# Patient Record
Sex: MALE | Race: OTHER | NOT HISPANIC OR LATINO
[De-identification: names, ages, dates, MRNs, and addresses within clinical notes are randomized per-mention and may not be internally consistent; named-entity substitution may affect disease eponyms.]

---
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
## 2020-07-22 NOTE — ASU PATIENT PROFILE, PEDIATRIC - TEACHING/LEARNING LEARNING PREFERENCES PEDS
Visit Information    Have you changed or started any medications since your last visit including any over-the-counter medicines, vitamins, or herbal medicines? no   Are you having any side effects from any of your medications? -  no  Have you stopped taking any of your medications? Is so, why? -  no    Have you seen any other physician or provider since your last visit? Yes - Records Obtained  Have you had any other diagnostic tests since your last visit? Yes - Records Obtained  Have you been seen in the emergency room and/or had an admission to a hospital since we last saw you? Yes - Records Obtained  Have you had your routine dental cleaning in the past 6 months? no    Have you activated your [employer] account? If not, what are your barriers?  Yes     Patient Care Team:  Amado Staton MD as PCP - General (Family Medicine)  Amado Staton MD as PCP - [facility] Provider  Nicolasa Flores MD as Referring Physician (Orthopedic Surgery)  Stefanie Person MD as Surgeon (Orthopedic Surgery)  Mylo Cabot, MD as Consulting Physician (Endocrinology)  Roberto Newell DO as Consulting Physician (Obstetrics & Gynecology)  Eliane Keith MD as Consulting Physician (Urology)  MAX Arndt CNP as Nurse Practitioner (Certified Nurse Practitioner)  Lucina Gilbert MD as Consulting Physician (Otolaryngology)  Beltran Basilio MD as Consulting Physician (Obstetrics & Gynecology)  Javon Guzman DO as Consulting Physician (General Surgery)  Ismael Sotomayor RN as Ambulatory Care Manager    Medical History Review  Past Medical, Family, and Social History reviewed and does contribute to the patient presenting condition    Health Maintenance   Topic Date Due    Diabetic retinal exam  01/08/1962    Shingles Vaccine (1 of 2) 01/08/2002    Diabetic foot exam  02/27/2018    Annual Wellness Visit (AWV)  05/29/2019    Breast cancer screen  05/06/2020    Colon cancer screen colonoscopy  06/26/2020    Flu vaccine (1) 09/01/2020    A1C test (Diabetic or Prediabetic)  01/09/2021    Lipid screen  01/28/2021    Diabetic microalbuminuria test  01/30/2021    TSH testing  06/17/2021    Potassium monitoring  07/21/2021    Creatinine monitoring  07/21/2021    DTaP/Tdap/Td vaccine (2 - Td) 09/28/2027    DEXA (modify frequency per FRAX score)  Completed    Pneumococcal 65+ years Vaccine  Completed    Hepatitis C screen  Completed    Hepatitis A vaccine  Aged Out    Hib vaccine  Aged Out    Meningococcal (ACWY) vaccine  Aged Out audio

## 2020-12-03 NOTE — ASU PATIENT PROFILE, PEDIATRIC - FALL HARM RISK
Procedure(s):  COLONOSCOPY  ESOPHAGOGASTRODUODENAL (EGD) BIOPSY.     total IV anesthesia, general    Anesthesia Post Evaluation      Multimodal analgesia: multimodal analgesia not used between 6 hours prior to anesthesia start to PACU discharge  Patient location during evaluation: bedside  Patient participation: complete - patient participated  Level of consciousness: sleepy but conscious  Airway patency: patent  Anesthetic complications: no  Cardiovascular status: acceptable  Respiratory status: acceptable  Hydration status: acceptable  Post anesthesia nausea and vomiting:  none      INITIAL Post-op Vital signs:   Vitals Value Taken Time   /66 12/3/2020  8:39 AM   Temp     Pulse 68 12/3/2020  8:39 AM   Resp 28 12/3/2020  8:39 AM   SpO2 96 % 12/3/2020  8:39 AM surgery

## 2021-05-26 ENCOUNTER — APPOINTMENT (OUTPATIENT)
Dept: DISASTER EMERGENCY | Facility: OTHER | Age: 21
End: 2021-05-26
Payer: COMMERCIAL

## 2021-05-26 PROBLEM — S83.211A: Chronic | Status: ACTIVE | Noted: 2018-04-06

## 2021-05-26 PROCEDURE — 0001A: CPT

## 2021-07-05 ENCOUNTER — APPOINTMENT (OUTPATIENT)
Dept: DISASTER EMERGENCY | Facility: OTHER | Age: 21
End: 2021-07-05

## 2022-09-26 NOTE — ASU DISCHARGE PLAN (ADULT/PEDIATRIC). - DISCHARGE TO
PAST SURGICAL HISTORY:  CABG (Coronary Artery Bypass Graft) [facility], [place] 2000 (LIMA to LAD, SVG to OM1)    History of prostate surgery     
Home

## 2023-07-27 ENCOUNTER — EMERGENCY (EMERGENCY)
Facility: HOSPITAL | Age: 23
LOS: 0 days | Discharge: HOME | End: 2023-07-27
Attending: EMERGENCY MEDICINE
Payer: MEDICAID

## 2023-07-27 VITALS
RESPIRATION RATE: 19 BRPM | DIASTOLIC BLOOD PRESSURE: 77 MMHG | WEIGHT: 285.06 LBS | OXYGEN SATURATION: 98 % | HEIGHT: 74 IN | TEMPERATURE: 99 F | SYSTOLIC BLOOD PRESSURE: 109 MMHG | HEART RATE: 99 BPM

## 2023-07-27 VITALS
TEMPERATURE: 98 F | DIASTOLIC BLOOD PRESSURE: 66 MMHG | RESPIRATION RATE: 18 BRPM | HEART RATE: 85 BPM | OXYGEN SATURATION: 99 % | SYSTOLIC BLOOD PRESSURE: 117 MMHG

## 2023-07-27 DIAGNOSIS — R00.2 PALPITATIONS: ICD-10-CM

## 2023-07-27 DIAGNOSIS — R07.89 OTHER CHEST PAIN: ICD-10-CM

## 2023-07-27 DIAGNOSIS — R06.02 SHORTNESS OF BREATH: ICD-10-CM

## 2023-07-27 DIAGNOSIS — Z98.890 OTHER SPECIFIED POSTPROCEDURAL STATES: Chronic | ICD-10-CM

## 2023-07-27 LAB
ALBUMIN SERPL ELPH-MCNC: 4.1 G/DL — SIGNIFICANT CHANGE UP (ref 3.3–5)
ALP SERPL-CCNC: 59 U/L — SIGNIFICANT CHANGE UP (ref 40–120)
ALT FLD-CCNC: 86 U/L — HIGH (ref 12–78)
ANION GAP SERPL CALC-SCNC: 7 MMOL/L — SIGNIFICANT CHANGE UP (ref 5–17)
APTT BLD: 30.3 SEC — SIGNIFICANT CHANGE UP (ref 24.5–35.6)
AST SERPL-CCNC: 57 U/L — HIGH (ref 15–37)
BASOPHILS # BLD AUTO: 0.04 K/UL — SIGNIFICANT CHANGE UP (ref 0–0.2)
BASOPHILS NFR BLD AUTO: 0.3 % — SIGNIFICANT CHANGE UP (ref 0–2)
BILIRUB SERPL-MCNC: 0.5 MG/DL — SIGNIFICANT CHANGE UP (ref 0.2–1.2)
BUN SERPL-MCNC: 16 MG/DL — SIGNIFICANT CHANGE UP (ref 7–23)
CALCIUM SERPL-MCNC: 9.1 MG/DL — SIGNIFICANT CHANGE UP (ref 8.5–10.1)
CHLORIDE SERPL-SCNC: 105 MMOL/L — SIGNIFICANT CHANGE UP (ref 96–108)
CO2 SERPL-SCNC: 25 MMOL/L — SIGNIFICANT CHANGE UP (ref 22–31)
CREAT SERPL-MCNC: 1.21 MG/DL — SIGNIFICANT CHANGE UP (ref 0.5–1.3)
D DIMER BLD IA.RAPID-MCNC: 373 NG/ML DDU — HIGH
EGFR: 86 ML/MIN/1.73M2 — SIGNIFICANT CHANGE UP
EOSINOPHIL # BLD AUTO: 0.04 K/UL — SIGNIFICANT CHANGE UP (ref 0–0.5)
EOSINOPHIL NFR BLD AUTO: 0.3 % — SIGNIFICANT CHANGE UP (ref 0–6)
GLUCOSE SERPL-MCNC: 94 MG/DL — SIGNIFICANT CHANGE UP (ref 70–99)
HCT VFR BLD CALC: 44.4 % — SIGNIFICANT CHANGE UP (ref 39–50)
HGB BLD-MCNC: 14.4 G/DL — SIGNIFICANT CHANGE UP (ref 13–17)
IMM GRANULOCYTES NFR BLD AUTO: 0.2 % — SIGNIFICANT CHANGE UP (ref 0–0.9)
INR BLD: 0.95 RATIO — SIGNIFICANT CHANGE UP (ref 0.85–1.18)
LYMPHOCYTES # BLD AUTO: 14.9 % — SIGNIFICANT CHANGE UP (ref 13–44)
LYMPHOCYTES # BLD AUTO: 2.06 K/UL — SIGNIFICANT CHANGE UP (ref 1–3.3)
MCHC RBC-ENTMCNC: 25.3 PG — LOW (ref 27–34)
MCHC RBC-ENTMCNC: 32.4 G/DL — SIGNIFICANT CHANGE UP (ref 32–36)
MCV RBC AUTO: 77.9 FL — LOW (ref 80–100)
MONOCYTES # BLD AUTO: 0.78 K/UL — SIGNIFICANT CHANGE UP (ref 0–0.9)
MONOCYTES NFR BLD AUTO: 5.7 % — SIGNIFICANT CHANGE UP (ref 2–14)
NEUTROPHILS # BLD AUTO: 10.85 K/UL — HIGH (ref 1.8–7.4)
NEUTROPHILS NFR BLD AUTO: 78.6 % — HIGH (ref 43–77)
NRBC # BLD: 0 /100 WBCS — SIGNIFICANT CHANGE UP (ref 0–0)
PLATELET # BLD AUTO: 272 K/UL — SIGNIFICANT CHANGE UP (ref 150–400)
POTASSIUM SERPL-MCNC: 4.7 MMOL/L — SIGNIFICANT CHANGE UP (ref 3.5–5.3)
POTASSIUM SERPL-SCNC: 4.7 MMOL/L — SIGNIFICANT CHANGE UP (ref 3.5–5.3)
PROT SERPL-MCNC: 8 GM/DL — SIGNIFICANT CHANGE UP (ref 6–8.3)
PROTHROM AB SERPL-ACNC: 11.4 SEC — SIGNIFICANT CHANGE UP (ref 9.5–13)
RBC # BLD: 5.7 M/UL — SIGNIFICANT CHANGE UP (ref 4.2–5.8)
RBC # FLD: 13.5 % — SIGNIFICANT CHANGE UP (ref 10.3–14.5)
SODIUM SERPL-SCNC: 137 MMOL/L — SIGNIFICANT CHANGE UP (ref 135–145)
TROPONIN I, HIGH SENSITIVITY RESULT: 4.4 NG/L — SIGNIFICANT CHANGE UP
TSH SERPL-MCNC: 0.85 UIU/ML — SIGNIFICANT CHANGE UP (ref 0.36–3.74)
WBC # BLD: 13.8 K/UL — HIGH (ref 3.8–10.5)
WBC # FLD AUTO: 13.8 K/UL — HIGH (ref 3.8–10.5)

## 2023-07-27 PROCEDURE — 71275 CT ANGIOGRAPHY CHEST: CPT | Mod: 26,MA

## 2023-07-27 PROCEDURE — 99285 EMERGENCY DEPT VISIT HI MDM: CPT

## 2023-07-27 PROCEDURE — 71045 X-RAY EXAM CHEST 1 VIEW: CPT | Mod: 26

## 2023-07-27 PROCEDURE — 93010 ELECTROCARDIOGRAM REPORT: CPT

## 2023-07-27 RX ORDER — ACETAMINOPHEN 500 MG
1000 TABLET ORAL ONCE
Refills: 0 | Status: COMPLETED | OUTPATIENT
Start: 2023-07-27 | End: 2023-07-27

## 2023-07-27 RX ADMIN — Medication 400 MILLIGRAM(S): at 17:30

## 2023-07-27 NOTE — ED PROVIDER NOTE - PATIENT PORTAL LINK FT
You can access the FollowMyHealth Patient Portal offered by [facility] by registering at the following website: [email or web address]. By joining [employer]’s FollowMyHealth portal, you will also be able to view your health information using other applications (apps) compatible with our system.

## 2023-07-27 NOTE — ED ADULT TRIAGE NOTE - PAIN RATING/NUMBER SCALE (0-10): ACTIVITY
[FreeTextEntry8] : MS. WESTFALL IS A PLEASANT 58 YO PRESENTING FOR FOLLOW-UP OF BACK PAIN.  PAIN IS NOW BETTER SINCE MONDAY WHEN WAS MUCH WORSE.  "I'M FEELING A LOT BETTER".  IS TO START PHYSICAL THERAPY ON MONDAY.  IT IS THE LEFT SIDE OF HER BACK WHEN BOTHERING HER.  BENDING UP AND DOWN MAKES IT WORSE.  ON VACATION FROM WORK THIS WEEK.  HAD BEEN LIFTING HEAVY BOXES PRIOR TO PAIN WORSENING.  IS TAKING NAPROXEN WHICH HELPS.  NO OTHER MUSCLE/JOINT PAINS.  IS OTHERWISE FEELING WELL.  NO D/H/F OR HEMATURIA.  NO EDEMA OR NUMBNESS/TINGLING EXTREMITIES. 5 (moderate pain)

## 2023-07-27 NOTE — ED PROVIDER NOTE - PROGRESS NOTE DETAILS
pt is chest pain free now and also denies headache, dizziness, sob, nausea, vomiting, abd pain. Pt is advised to follow up with Dr. Clancy cardiologist and return if symptoms persist or worsen.

## 2023-07-27 NOTE — ED PROVIDER NOTE - CLINICAL SUMMARY MEDICAL DECISION MAKING FREE TEXT BOX
pt presented with localized intermittent right side chest pain and palpitations and sob. Pt's ekg normal ct angio chest no pulmonary embolism trop normal Pt is chest pain free now pt is discharged and follow up with the cardiologist pt presented with localized intermittent right side chest pain and palpitations and sob. Pt's ekg normal cardiac origin is unlikely ct angio chest no pulmonary embolism trop normal Pt is chest pain free now pt is discharged and follow up with the cardiologist cxr is reviewed.

## 2023-07-27 NOTE — ED ADULT NURSE NOTE - OBJECTIVE STATEMENT
23M aaox4 ambulatory with no known medical history p/w c/o left upper chest wall pain that started around 2am before he went to sleep and again happened after he woke up, pain is constant non radiating. Patient denies any sob or diaphoresis, no dizziness, chills or fever, nausea, vomiting or abd pain. VS WDL. 12 lead ekg unremarkable.   Rt ac 20g IV access inserted, labs sent pending results.   due meds given as ordered and tolerated well.

## 2023-07-27 NOTE — ED PROVIDER NOTE - CARE PROVIDER_API CALL
Kg Carreon  Cardiovascular Disease  [address] [place] [address]  Phone: [phone]  Fax: [phone]  Follow Up Time: Urgent

## 2023-07-27 NOTE — ED PROVIDER NOTE - CONSTITUTIONAL, MLM
Well appearing, awake, alert, oriented to person, place, time/situation and in no apparent distress. Speaking in clear full sentences no nasal flaring no shoulders retractions no diaphoresis, pt keeps poking his right side of chest normal...

## 2023-07-27 NOTE — ED PROVIDER NOTE - NSICDXPASTSURGICALHX_GEN_ALL_CORE_FT
PAST SURGICAL HISTORY:  History of orthopedic surgery H/o right knee surgery, s/p ACL repair and meniscus repair with Dr. Saunders

## 2023-07-27 NOTE — ED PROVIDER NOTE - OBJECTIVE STATEMENT
23 years old male walked in with mom c/o pulling pain to the right side chest, heart racing and sob intermittently since 13:00 pm today Pt sts each episode lasted in seconds. Pt has no past medical problems, + father mi in age of 60s. Pt is not a smoker. Pt also c/o not sleeping well last night. Pt denies recent hx of trauma, traveling, headache, dizziness, blurred visions, light sensitivities, focal/distal weakness or numbness ,neck/back/hips/calfs pain, difficulty of walking or keeping balances, cough, nausea, vomiting, fever, chills, abd pain, dysuria or irregular bowel movements. Pt sts he took aspiris three tabs.

## 2023-08-30 ENCOUNTER — NON-APPOINTMENT (OUTPATIENT)
Age: 23
End: 2023-08-30

## 2023-08-30 ENCOUNTER — APPOINTMENT (OUTPATIENT)
Dept: CARDIOLOGY | Facility: CLINIC | Age: 23
End: 2023-08-30
Payer: MEDICAID

## 2023-08-30 VITALS
OXYGEN SATURATION: 98 % | WEIGHT: 302 LBS | TEMPERATURE: 98.4 F | DIASTOLIC BLOOD PRESSURE: 76 MMHG | BODY MASS INDEX: 38.76 KG/M2 | HEART RATE: 86 BPM | HEIGHT: 74 IN | SYSTOLIC BLOOD PRESSURE: 122 MMHG

## 2023-08-30 DIAGNOSIS — R00.2 PALPITATIONS: ICD-10-CM

## 2023-08-30 DIAGNOSIS — R07.9 CHEST PAIN, UNSPECIFIED: ICD-10-CM

## 2023-08-30 PROCEDURE — 99204 OFFICE O/P NEW MOD 45 MIN: CPT | Mod: 25

## 2023-08-30 PROCEDURE — 93000 ELECTROCARDIOGRAM COMPLETE: CPT | Mod: 59

## 2023-08-30 NOTE — DISCUSSION/SUMMARY
[FreeTextEntry1] : 23M presents to establish care  1. CP/palpitations -unclear etiolgy of symptoms -ekg showing SR -euvolemic -currently comfortable appearing -may be related to vaping, consider pulm eval -will check tte to evaluate for structural heart dz -will check extended holter to capture some of these episodes  f/u after initial testing >45 min spent on complete encounter [EKG obtained to assist in diagnosis and management of assessed problem(s)] : EKG obtained to assist in diagnosis and management of assessed problem(s)

## 2023-08-30 NOTE — REVIEW OF SYSTEMS
[Fever] : no fever [Headache] : no headache [Weight Gain (___ Lbs)] : no recent weight gain [Chills] : no chills [Feeling Fatigued] : not feeling fatigued [Weight Loss (___ Lbs)] : no recent weight loss [Blurry Vision] : no blurred vision [Sore Throat] : no sore throat [SOB] : no shortness of breath [Dyspnea on exertion] : not dyspnea during exertion [Chest Discomfort] : chest discomfort [Lower Ext Edema] : no extremity edema [Palpitations] : palpitations [Syncope] : no syncope [Cough] : no cough [Wheezing] : no wheezing [Nausea] : no nausea [Vomiting] : no vomiting [Dizziness] : no dizziness [Confusion] : no confusion was observed [Easy Bleeding] : no tendency for easy bleeding [Easy Bruising] : no tendency for easy bruising [FreeTextEntry5] : see hpi

## 2023-08-30 NOTE — HISTORY OF PRESENT ILLNESS
[FreeTextEntry1] : 23M presents to establish care Sent in by: found online PMD: none sister present for complete encounter  pt endorsing chest pain over the past few weeks. states he went to the ER last month and was sent home, told everything it was fine. symptoms are intermittent but occur daily. can occur at ret or on exertion. pt states thinking about the symptoms make it worse, unable to describe any other exacerbating or alleviating factors.  +assoc palpitations. no sob. pt state on one occasion he had an ekg and was told there was an abnormality, but unclear on further details.   pt states in the er, he had a cta chest which did not show a PE. trop negative.   denies CP, SOB, at rest or on exertion. Denies dizziness, diaphoresis, syncope, LE edema, orthopnea  Exercise: walking Diet: none   Prev cardiac history: none Previous cardiac testing: none Recent labs:   EKG: SR   Med hx: none Sx hx: R ACL repair	 Family hx: F: CAD/ PCI  Social hx:  lives in [place] with parens and brother. works part time [employer]    +vape, recently stopped. no etoh/drugs Meds: vitamins Allergies: nkda

## 2025-06-12 NOTE — BRIEF OPERATIVE NOTE - TYPE OF ANESTHESIA
[Ankle Swelling (On Exam)] : present [Ankle Swelling Bilaterally] : severe [No Rash or Lesion] : No rash or lesion [Alert] : alert [Oriented to Person] : oriented to person [Oriented to Place] : oriented to place [Oriented to Time] : oriented to time [2+] : left 2+ [0] : left 0 [Varicose Veins Of Lower Extremities] : not present General [de-identified] : ambulating with cane [FreeTextEntry1] : B/L LE warm, well perfused no wounds Moderate edema bilaterally L > R, no weeping Hyperkeratosis left distal shin